# Patient Record
Sex: MALE | Race: WHITE | NOT HISPANIC OR LATINO | Employment: FULL TIME | URBAN - METROPOLITAN AREA
[De-identification: names, ages, dates, MRNs, and addresses within clinical notes are randomized per-mention and may not be internally consistent; named-entity substitution may affect disease eponyms.]

---

## 2018-08-22 ENCOUNTER — OFFICE VISIT - HEALTHEAST (OUTPATIENT)
Dept: CARDIOLOGY | Facility: CLINIC | Age: 43
End: 2018-08-22

## 2018-08-22 DIAGNOSIS — R07.89 CHEST PRESSURE: ICD-10-CM

## 2018-08-22 LAB
ATRIAL RATE - MUSE: 70 BPM
CHOLEST SERPL-MCNC: 222 MG/DL
DIASTOLIC BLOOD PRESSURE - MUSE: NORMAL MMHG
ERYTHROCYTE [SEDIMENTATION RATE] IN BLOOD BY WESTERGREN METHOD: 2 MM/HR (ref 0–15)
FASTING STATUS PATIENT QL REPORTED: YES
HDLC SERPL-MCNC: 41 MG/DL
INTERPRETATION ECG - MUSE: NORMAL
LDLC SERPL CALC-MCNC: 129 MG/DL
P AXIS - MUSE: 1 DEGREES
PR INTERVAL - MUSE: 172 MS
QRS DURATION - MUSE: 96 MS
QT - MUSE: 396 MS
QTC - MUSE: 427 MS
R AXIS - MUSE: 48 DEGREES
SYSTOLIC BLOOD PRESSURE - MUSE: NORMAL MMHG
T AXIS - MUSE: 32 DEGREES
TRIGL SERPL-MCNC: 260 MG/DL
VENTRICULAR RATE- MUSE: 70 BPM

## 2018-08-22 ASSESSMENT — MIFFLIN-ST. JEOR: SCORE: 1880.56

## 2018-09-05 ENCOUNTER — HOSPITAL ENCOUNTER (OUTPATIENT)
Dept: CARDIOLOGY | Facility: CLINIC | Age: 43
Discharge: HOME | End: 2018-09-05
Attending: INTERNAL MEDICINE

## 2018-09-05 DIAGNOSIS — R07.89 CHEST PRESSURE: ICD-10-CM

## 2018-09-05 LAB
CV STRESS CURRENT BP HE: NORMAL
CV STRESS CURRENT HR HE: 101
CV STRESS CURRENT HR HE: 104
CV STRESS CURRENT HR HE: 104
CV STRESS CURRENT HR HE: 105
CV STRESS CURRENT HR HE: 106
CV STRESS CURRENT HR HE: 108
CV STRESS CURRENT HR HE: 113
CV STRESS CURRENT HR HE: 125
CV STRESS CURRENT HR HE: 126
CV STRESS CURRENT HR HE: 129
CV STRESS CURRENT HR HE: 134
CV STRESS CURRENT HR HE: 134
CV STRESS CURRENT HR HE: 135
CV STRESS CURRENT HR HE: 135
CV STRESS CURRENT HR HE: 136
CV STRESS CURRENT HR HE: 139
CV STRESS CURRENT HR HE: 143
CV STRESS CURRENT HR HE: 149
CV STRESS CURRENT HR HE: 152
CV STRESS CURRENT HR HE: 152
CV STRESS CURRENT HR HE: 157
CV STRESS CURRENT HR HE: 160
CV STRESS CURRENT HR HE: 165
CV STRESS CURRENT HR HE: 165
CV STRESS CURRENT HR HE: 83
CV STRESS CURRENT HR HE: 85
CV STRESS CURRENT HR HE: 95
CV STRESS CURRENT HR HE: 96
CV STRESS DEVIATION TIME HE: NORMAL
CV STRESS ECHO PERCENT HR HE: NORMAL
CV STRESS EXERCISE STAGE HE: NORMAL
CV STRESS FINAL RESTING BP HE: NORMAL
CV STRESS FINAL RESTING HR HE: 96
CV STRESS MAX HR HE: 167
CV STRESS MAX TREADMILL GRADE HE: 14
CV STRESS MAX TREADMILL SPEED HE: 3.4
CV STRESS PEAK DIA BP HE: NORMAL
CV STRESS PEAK SYS BP HE: NORMAL
CV STRESS PHASE HE: NORMAL
CV STRESS PROTOCOL HE: NORMAL
CV STRESS RESTING PT POSITION HE: NORMAL
CV STRESS RESTING PT POSITION HE: NORMAL
CV STRESS ST DEVIATION AMOUNT HE: NORMAL
CV STRESS ST DEVIATION ELEVATION HE: NORMAL
CV STRESS ST EVELATION AMOUNT HE: NORMAL
CV STRESS TEST TYPE HE: NORMAL
CV STRESS TOTAL STAGE TIME MIN 1 HE: NORMAL
ECHO EJECTION FRACTION ESTIMATED: 55 %
STRESS ECHO BASELINE BP: NORMAL
STRESS ECHO BASELINE HR: 85
STRESS ECHO CALCULATED PERCENT HR: 93 %
STRESS ECHO LAST STRESS BP: NORMAL
STRESS ECHO LAST STRESS HR: 165
STRESS ECHO POST ESTIMATED WORKLOAD: 10.3
STRESS ECHO POST EXERCISE DUR MIN: 9
STRESS ECHO POST EXERCISE DUR SEC: 0
STRESS ECHO TARGET HR: 152

## 2021-03-04 ENCOUNTER — RECORDS - HEALTHEAST (OUTPATIENT)
Dept: LAB | Facility: CLINIC | Age: 46
End: 2021-03-04

## 2021-03-04 LAB
CHOLEST SERPL-MCNC: 224 MG/DL
FASTING STATUS PATIENT QL REPORTED: ABNORMAL
HDLC SERPL-MCNC: 33 MG/DL
LDLC SERPL CALC-MCNC: 122 MG/DL
LDLC SERPL CALC-MCNC: ABNORMAL MG/DL
TRIGL SERPL-MCNC: 577 MG/DL

## 2021-06-01 VITALS — WEIGHT: 217 LBS | HEIGHT: 70 IN | BODY MASS INDEX: 31.07 KG/M2

## 2021-06-20 NOTE — PROGRESS NOTES
Consultation - [facility]  Robin Dawson,  1975, MRN [id number]    PCP: No Primary Care Provider, None    Assessment and Plan: Chest pain syndrome.  Symptoms are very atypical for coronary disease.  The prolonged duration and constant nature is extremely atypical for an anginal or ischemic syndrome.  No evidence for infarction.  However I cannot exclude that it could be different components to his pain and may be precipitated by ischemia and then a prolonged discomfort noncardiac gastrointestinal or musculoskeletal origin.  In this regard my suggestion would be is to arrange for stress exercise echo study to exclude ischemic heart disease from differential also check an ESR today to exclude the possibility that this may have represented acute pericarditis but without ECG transformation.  For now avoid heavy strenuous activity.  If the symptom would recur he should go back to the ER.  Currently is asymptomatic and I think as long as he is level of activity is low without symptoms can observe as an outpatient until stress study.  Recommendations: Above    Chief Complaint: Atypical chest pain unlikely cardiac origin    HPI:  We have been requested by pt to evaluate Robin Dawson for consultation who is a  43 y.o. year old male for chest pain.   Hx: 43 visit Monday.  shortness of breath chest pressure dizzy sweats.  Sx began Fri night persist into Sat improved Sun then incr Mon before work.  Did not resolve at any pt over w/end. No previous sx, chronic cough wheeze, asthma by hx as child.  Went to ER and released.  Felt much better after release.  Fatigue. Tues fatigue.  CP shortness of breath returned w walk but not rest.  Sx much improved.  Today cough.  Current not sx.  Fri shiver and cold feeling no actual fever.    On no meds, no pmh x above. No allergy  Given NTG 2 in er w varinder but no change in sx.        Medical History  There are no active non-hospital problems to display for this  patient.    No past medical history on file.    Surgical History  He  has no past surgical history on file.    Social History  Reviewed, and he  reports that he has been smoking Cigarettes.  He does not have any smokeless tobacco history on file. He reports that he drinks alcohol. He reports that he does not use illicit drugs.  Smoking status reviewed.  Social history othrwise not contributory to HPI.  Allergies  No Known Allergies    Family History  Reviewed, and family history is not on file.  Extended Emergency Contact Information  Primary Emergency Contact: Katie Erazo   [facility]  Home Phone: [phone]  Relation: Significant Other  Family history otherwise negative or not conributory to HPI.    Psychosocial Needs  Social History     Social History Narrative     Additional psychosocial needs reviewed per nursing assessment.    Prior to Admission Medications    (Not in a hospital admission)    Review of Systems:  Pertinent items are noted in HPI.  Review of systems is negative except for HPI  Physical Exam:  Less than 10 mL of urine  Vitals:    08/22/18 0817   BP: 130/90   Pulse: 68   Resp: 18     Head and neck without focal cranial neurologic defects.  JVD not distended.  Carotid upstroke normal without bruit.  External eye exam normal without icterus.  External ear exam normal.  Neck without cervical lymphadenopathy or thyromegaly.  Lungs: clear to auscultation bilaterally  Heart: regular rate and rhythm, S1, S2 normal, no murmur, click, rub or gallop   Abdomen with normal bowel tones.  Skin without rash, ecchymosis, lesions.  Neuromuscular tone normal.  Peripheral pulse intact and equal.  Joints without swelling or erythema.      [unfilled]    Pertinent Labs  Lab Results: personally reviewed.   Lab Results   Component Value Date     08/20/2018    K 3.9 08/20/2018     (H) 08/20/2018    CO2 22 08/20/2018    BUN 8 08/20/2018    CREATININE 0.89 08/20/2018    CALCIUM 9.1  08/20/2018     Lab Results   Component Value Date    TROPONINI <0.01 08/20/2018     Lab Results   Component Value Date    WBC 7.5 08/20/2018    HGB 16.5 08/20/2018    HCT 45.8 08/20/2018    MCV 91 08/20/2018     08/20/2018     No results found for: CHOL, TRIG, HDL, LDLDIRECT    Pertinent Radiology  Radiology Results: See Report  EKG Results: See Report       Current Outpatient Prescriptions:      cholecalciferol, vitamin D3, 1,000 unit tablet, Take 1,000 Units by mouth daily., Disp: , Rfl:      CYANOCOBALAMIN, VITAMIN B-12, ORAL, Take 1 tablet by mouth daily., Disp: , Rfl:      UNABLE TO FIND, 1 tablet. Med Name: Alive Multivitamin chewable, Disp: , Rfl:

## 2023-02-06 NOTE — H&P
Colorectal Surgery Staff:  I have seen and examined the patient. I agree with the above documentation and plan of the fellow/PA above with the following additions/chages:    47M w/ anal fistula    Plan for rectal exam unde anesthesia, possible fistulotomy, possible seton    MD ADRIA KnappA  [facility]  Office: [phone]  2/16/2023 7:42 AM

## 2023-02-10 RX ORDER — MULTIPLE VITAMINS W/ MINERALS TAB 9MG-400MCG
1 TAB ORAL DAILY
COMMUNITY

## 2023-02-10 RX ORDER — CHLORAL HYDRATE 500 MG
2 CAPSULE ORAL DAILY
COMMUNITY

## 2023-02-15 ENCOUNTER — ANESTHESIA EVENT (OUTPATIENT)
Dept: SURGERY | Facility: AMBULATORY SURGERY CENTER | Age: 48
End: 2023-02-15

## 2023-02-16 ENCOUNTER — HOSPITAL ENCOUNTER (OUTPATIENT)
Facility: AMBULATORY SURGERY CENTER | Age: 48
Discharge: HOME | End: 2023-02-16
Attending: COLON & RECTAL SURGERY

## 2023-02-16 ENCOUNTER — ANESTHESIA (OUTPATIENT)
Dept: SURGERY | Facility: AMBULATORY SURGERY CENTER | Age: 48
End: 2023-02-16

## 2023-02-16 VITALS
DIASTOLIC BLOOD PRESSURE: 53 MMHG | BODY MASS INDEX: 24.5 KG/M2 | OXYGEN SATURATION: 99 % | TEMPERATURE: 97.4 F | RESPIRATION RATE: 16 BRPM | HEIGHT: 71 IN | SYSTOLIC BLOOD PRESSURE: 93 MMHG | HEART RATE: 55 BPM | WEIGHT: 175 LBS

## 2023-02-16 DIAGNOSIS — K60.30 ANAL FISTULA: Primary | ICD-10-CM

## 2023-02-16 RX ORDER — ACETAMINOPHEN 325 MG/1
650 TABLET ORAL
Status: DISCONTINUED | OUTPATIENT
Start: 2023-02-16 | End: 2023-02-17 | Stop reason: HOSPADM

## 2023-02-16 RX ORDER — OXYCODONE HYDROCHLORIDE 5 MG/1
5 TABLET ORAL EVERY 4 HOURS PRN
Status: DISCONTINUED | OUTPATIENT
Start: 2023-02-16 | End: 2023-02-17 | Stop reason: HOSPADM

## 2023-02-16 RX ORDER — ONDANSETRON 2 MG/ML
INJECTION INTRAMUSCULAR; INTRAVENOUS PRN
Status: DISCONTINUED | OUTPATIENT
Start: 2023-02-16 | End: 2023-02-16

## 2023-02-16 RX ORDER — ONDANSETRON 4 MG/1
4 TABLET, ORALLY DISINTEGRATING ORAL
Status: DISCONTINUED | OUTPATIENT
Start: 2023-02-16 | End: 2023-02-17 | Stop reason: HOSPADM

## 2023-02-16 RX ORDER — FENTANYL CITRATE 0.05 MG/ML
50 INJECTION, SOLUTION INTRAMUSCULAR; INTRAVENOUS EVERY 5 MIN PRN
Status: DISCONTINUED | OUTPATIENT
Start: 2023-02-16 | End: 2023-02-17 | Stop reason: HOSPADM

## 2023-02-16 RX ORDER — FENTANYL CITRATE 50 UG/ML
INJECTION, SOLUTION INTRAMUSCULAR; INTRAVENOUS PRN
Status: DISCONTINUED | OUTPATIENT
Start: 2023-02-16 | End: 2023-02-16

## 2023-02-16 RX ORDER — HYDROMORPHONE HCL IN WATER/PF 6 MG/30 ML
0.2 PATIENT CONTROLLED ANALGESIA SYRINGE INTRAVENOUS EVERY 5 MIN PRN
Status: DISCONTINUED | OUTPATIENT
Start: 2023-02-16 | End: 2023-02-17 | Stop reason: HOSPADM

## 2023-02-16 RX ORDER — OXYCODONE HYDROCHLORIDE 10 MG/1
10 TABLET ORAL EVERY 4 HOURS PRN
Status: DISCONTINUED | OUTPATIENT
Start: 2023-02-16 | End: 2023-02-17 | Stop reason: HOSPADM

## 2023-02-16 RX ORDER — ONDANSETRON 2 MG/ML
4 INJECTION INTRAMUSCULAR; INTRAVENOUS EVERY 30 MIN PRN
Status: DISCONTINUED | OUTPATIENT
Start: 2023-02-16 | End: 2023-02-17 | Stop reason: HOSPADM

## 2023-02-16 RX ORDER — ACETAMINOPHEN 160 MG
TABLET,DISINTEGRATING ORAL PRN
Status: DISCONTINUED | OUTPATIENT
Start: 2023-02-16 | End: 2023-02-16 | Stop reason: HOSPADM

## 2023-02-16 RX ORDER — DEXAMETHASONE SODIUM PHOSPHATE 4 MG/ML
INJECTION, SOLUTION INTRA-ARTICULAR; INTRALESIONAL; INTRAMUSCULAR; INTRAVENOUS; SOFT TISSUE PRN
Status: DISCONTINUED | OUTPATIENT
Start: 2023-02-16 | End: 2023-02-16

## 2023-02-16 RX ORDER — ACETAMINOPHEN 325 MG/1
975 TABLET ORAL ONCE
Status: COMPLETED | OUTPATIENT
Start: 2023-02-16 | End: 2023-02-16

## 2023-02-16 RX ORDER — ACETAMINOPHEN 325 MG/1
650 TABLET ORAL EVERY 4 HOURS PRN
Qty: 100 TABLET | Refills: 0 | COMMUNITY
Start: 2023-02-16 | End: 2023-03-02

## 2023-02-16 RX ORDER — PROPOFOL 10 MG/ML
INJECTION, EMULSION INTRAVENOUS PRN
Status: DISCONTINUED | OUTPATIENT
Start: 2023-02-16 | End: 2023-02-16

## 2023-02-16 RX ORDER — LIDOCAINE HYDROCHLORIDE 10 MG/ML
INJECTION, SOLUTION INFILTRATION; PERINEURAL PRN
Status: DISCONTINUED | OUTPATIENT
Start: 2023-02-16 | End: 2023-02-16

## 2023-02-16 RX ORDER — SODIUM CHLORIDE, SODIUM LACTATE, POTASSIUM CHLORIDE, CALCIUM CHLORIDE 600; 310; 30; 20 MG/100ML; MG/100ML; MG/100ML; MG/100ML
INJECTION, SOLUTION INTRAVENOUS CONTINUOUS
Status: DISCONTINUED | OUTPATIENT
Start: 2023-02-16 | End: 2023-02-17 | Stop reason: HOSPADM

## 2023-02-16 RX ORDER — FENTANYL CITRATE 0.05 MG/ML
25 INJECTION, SOLUTION INTRAMUSCULAR; INTRAVENOUS EVERY 5 MIN PRN
Status: DISCONTINUED | OUTPATIENT
Start: 2023-02-16 | End: 2023-02-17 | Stop reason: HOSPADM

## 2023-02-16 RX ORDER — ONDANSETRON 4 MG/1
4 TABLET, ORALLY DISINTEGRATING ORAL EVERY 30 MIN PRN
Status: DISCONTINUED | OUTPATIENT
Start: 2023-02-16 | End: 2023-02-17 | Stop reason: HOSPADM

## 2023-02-16 RX ORDER — HYDROMORPHONE HCL IN WATER/PF 6 MG/30 ML
0.4 PATIENT CONTROLLED ANALGESIA SYRINGE INTRAVENOUS EVERY 5 MIN PRN
Status: DISCONTINUED | OUTPATIENT
Start: 2023-02-16 | End: 2023-02-17 | Stop reason: HOSPADM

## 2023-02-16 RX ORDER — OXYCODONE HYDROCHLORIDE 5 MG/1
5-10 TABLET ORAL EVERY 6 HOURS PRN
Qty: 8 TABLET | Refills: 0 | Status: SHIPPED | OUTPATIENT
Start: 2023-02-16 | End: 2023-02-19

## 2023-02-16 RX ORDER — PROPOFOL 10 MG/ML
INJECTION, EMULSION INTRAVENOUS CONTINUOUS PRN
Status: DISCONTINUED | OUTPATIENT
Start: 2023-02-16 | End: 2023-02-16

## 2023-02-16 RX ORDER — NEOSTIGMINE METHYLSULFATE 1 MG/ML
VIAL (ML) INJECTION PRN
Status: DISCONTINUED | OUTPATIENT
Start: 2023-02-16 | End: 2023-02-16

## 2023-02-16 RX ORDER — LIDOCAINE 40 MG/G
CREAM TOPICAL
Status: DISCONTINUED | OUTPATIENT
Start: 2023-02-16 | End: 2023-02-17 | Stop reason: HOSPADM

## 2023-02-16 RX ORDER — GLYCOPYRROLATE 0.2 MG/ML
INJECTION, SOLUTION INTRAMUSCULAR; INTRAVENOUS PRN
Status: DISCONTINUED | OUTPATIENT
Start: 2023-02-16 | End: 2023-02-16

## 2023-02-16 RX ADMIN — Medication 4 MG: at 08:42

## 2023-02-16 RX ADMIN — PROPOFOL 200 MG: 10 INJECTION, EMULSION INTRAVENOUS at 07:54

## 2023-02-16 RX ADMIN — DEXAMETHASONE SODIUM PHOSPHATE 4 MG: 4 INJECTION, SOLUTION INTRA-ARTICULAR; INTRALESIONAL; INTRAMUSCULAR; INTRAVENOUS; SOFT TISSUE at 07:54

## 2023-02-16 RX ADMIN — PROPOFOL 200 MCG/KG/MIN: 10 INJECTION, EMULSION INTRAVENOUS at 07:58

## 2023-02-16 RX ADMIN — GLYCOPYRROLATE 0.8 MG: 0.2 INJECTION, SOLUTION INTRAMUSCULAR; INTRAVENOUS at 08:42

## 2023-02-16 RX ADMIN — LIDOCAINE HYDROCHLORIDE 30 MG: 10 INJECTION, SOLUTION INFILTRATION; PERINEURAL at 07:54

## 2023-02-16 RX ADMIN — SODIUM CHLORIDE, SODIUM LACTATE, POTASSIUM CHLORIDE, CALCIUM CHLORIDE: 600; 310; 30; 20 INJECTION, SOLUTION INTRAVENOUS at 07:29

## 2023-02-16 RX ADMIN — FENTANYL CITRATE 50 MCG: 50 INJECTION, SOLUTION INTRAMUSCULAR; INTRAVENOUS at 07:54

## 2023-02-16 RX ADMIN — ACETAMINOPHEN 975 MG: 325 TABLET ORAL at 07:29

## 2023-02-16 RX ADMIN — Medication 50 MG: at 07:54

## 2023-02-16 RX ADMIN — ONDANSETRON 4 MG: 2 INJECTION INTRAMUSCULAR; INTRAVENOUS at 07:54

## 2023-02-16 RX ADMIN — OXYCODONE HYDROCHLORIDE 5 MG: 5 TABLET ORAL at 10:15

## 2023-02-16 RX ADMIN — FENTANYL CITRATE 50 MCG: 50 INJECTION, SOLUTION INTRAMUSCULAR; INTRAVENOUS at 08:14

## 2023-02-16 ASSESSMENT — COPD QUESTIONNAIRES: COPD: 0

## 2023-02-16 ASSESSMENT — LIFESTYLE VARIABLES: TOBACCO_USE: 1

## 2023-02-16 ASSESSMENT — ENCOUNTER SYMPTOMS
SEIZURES: 0
DYSRHYTHMIAS: 0

## 2023-02-16 NOTE — ANESTHESIA POSTPROCEDURE EVALUATION
Patient: Robin Dawson    Procedure: Procedure(s):  EXAM UNDER ANESTHESIA, DEBRIDEMENT OF ABSCESS CAVITY, SETON PLACEMENT       Anesthesia Type:  General    Note:  Disposition: Outpatient   Postop Pain Control: Uneventful            Sign Out: Well controlled pain   PONV: No   Neuro/Psych: Uneventful            Sign Out: Acceptable/Baseline neuro status   Airway/Respiratory: Uneventful            Sign Out: Acceptable/Baseline resp. status   CV/Hemodynamics: Uneventful            Sign Out: Acceptable CV status; No obvious hypovolemia; No obvious fluid overload   Other NRE: NONE   DID A NON-ROUTINE EVENT OCCUR? No           Last vitals:  Vitals Value Taken Time   /61 02/16/23 0925   Temp 97.5  F (36.4  C) 02/16/23 0906   Pulse 86 02/16/23 0924   Resp 16 02/16/23 0925   SpO2 96 % 02/16/23 0925   Vitals shown include unvalidated device data.    Electronically Signed By: Concha Melgar MD  February 16, 2023  10:25 AM

## 2023-02-16 NOTE — DISCHARGE INSTRUCTIONS
Patient Discharge Instructions:    You have just undergone anorectal surgery.  Here are a few things to expect after your surgery:    1) It is common to have pain after surgery.  We try to focus on non-narcotic medications when possible, although sometimes we do give a narcotic prescription for pain that cannot be controlled with Tylenol and Ibuprofen. Take the medications as follows:  Tylenol: 650mg every 4 hours for 48 hours. Then every 4 hour as needed for pain. Do not take the Tylenol if you have been otherwise directed by a doctor not to take it (for liver problems, etc).  Ibuprofen: 800mg every 8 hours for 48 hours WITH FOOD. Then every 8 hours as needed for pain. Don't take NSAIDs if you have Crohn's, Ulcerative Colitis, kidney problems, or have been otherwise instructed not to take them.   You should offset these medications (e.g. Tylenol at 12:00PM, Ibuprofen at 2pm, Tylenol at 4:00PM, Tylenol at 8:00PM, Ibuprofen at 10:00PM).  If you have Oxycodone, you can take it at any time as long as you space it out every 4 hours.     2) You may have some spotting or mild amounts of bleeding/bloody drainage.  If you see more significant bleeding, try holding some pressure over the wound for 15-20 minutes.   If you pass more than a cup full of blood or you cannot get the bleeding to stop by holding pressure, call the office.    3) Infections in this area are rare, but can be serious.  If you see small amounts of yellowish/pus like drainage in the days following surgery, this is expected. However, if you have increasing pain, increasing drainage, fevers, or an inability to urinate (can't pee), call the office or go the ER.     4) You will feel numb in the anorectal area for 4-6 hours after surgery due to the numbing medication used in the operating room.  It is possible you may experience some fecal incontinence (accidental passage of gas/stool) during this time.  The numbness will resolve on its own.  Once you feel  sensation returning, you should consider taking something for pain as you can expect oral medications to take 30-60 minutes before you start feeling their effects.    5) There is generally no specific wound care necessary immediately after surgery (unless otherwise instructed by your surgeon).  Simply showering/bathing 1-2 times a day and after bowel movements is sufficient to keep the wounds clean.  You may want to keep a dry gauze/pad over the wound site as it is normal to have some amount of drainage, and this drainage can be irritating to the skin.  If you have a packing in the wound, your surgeon will give you more specific instructions on how to manage this.    6) For some operations you may have stitches in your wound.  Those stitches almost always break within a few days of surgery.  If you feel a pop or the wound opens - this is OK.  The wound will continue to fill in on its own.  It is still ok to shower/bathe as normal.  Expect some amount of drainage if the wound is open.    7) Avoiding constipation after surgery is very important. Start by taking Psyllium Husk (e.g. Metamucil or Konsyl) 1-2 tablespoons in a large glass of water daily. In addition to this, it is very important to drink at least 64 ounces of water daily. If you get constipated, it will be much more uncomfortable when you do have a bowel movement. If you do not have a BM in 2 days, try one of the following medications (over the counter) listed below:   Milk of Magnesia 30 mL every 8 hours until BM  Miralax 1-2 capfuls daily until BM (this may take 1-2 days)  Senna 1-2 tabs twice a day as necessary    8) Your only activity restrictions are no driving while you are taking narcotics.  You may want to avoid heavy lifting/strenuous exercise for the first 1-2 days after surgery, but if you feel up to resuming normal activities/exercise, this is ok.    9) Lastly, if you have any questions or concerns - PLEASE CALL ([phone])!  Our office has  someone on call 24 hours a day.  If your question is one that can wait until normal business hours (Mon-Fri 8:30AM-5PM), it is better to wait until the daytime as someone more familiar with your care will be able to help you.  However, if it is an emergency, the on-call surgeon will be able to give you good advice.    Leana Yo MD, RUBA  [facility]  Office: [phone]  2/16/2023 7:57 AM    Take tylenol and ibuprofen every 6 hours as your main form of pain control.     Acetaminophen (Tylenol): Next Dose: 1:29 pm. Take 650-1000 mg every 6 hours for mild to moderate pain.    Ibuprofen (Motrin, Advil): Next Dose: When you get home. Take 600 mg every 6 hours for mild to moderate pain.    Do not exceed 4,000 mg of acetaminophen during a 24 hour period  or 1000 mg per dose. Keep in mind that acetaminophen can also be found in many over-the-counter cold medications as well as narcotics that may be given for pain.     Narcotics:    Oxycodone: Next Dose: 4:15 pm Take 1-2 tab(s) every 6 hours for moderate to severe pain not controlled with tylenol and ibuprofen.    Senna-Docusate (Stool Softener): Next dose: When you get home. Take as instructed on the bottle. This is an over-the-counter medication. Take this while taking narcotic medications to prevent constipation.    If you have any questions or concerns regarding your procedure, please contact Dr. Yo, his office number is [phone].     Adult Discharge Orders & Instructions     For 24 hours after surgery    Get plenty of rest.  A responsible adult must stay with you for at least 24 hours after you leave the hospital.   Do not drive or use heavy equipment.  If you have weakness or tingling, don't drive or use heavy equipment until this feeling goes away.  Do not drink alcohol.  Avoid strenuous or risky activities.  Ask for help when climbing stairs.   You may feel lightheaded.  IF so, sit for a few minutes before standing.  Have someone  help you get up.   If you have nausea (feel sick to your stomach): Drink only clear liquids such as apple juice, ginger ale, broth or 7-Up.  Rest may also help.  Be sure to drink enough fluids.  Move to a regular diet as you feel able.  You may have a slight fever. Call the doctor if your fever is over 100 F (37.7 C) (taken under the tongue) or lasts longer than 24 hours.  You may have a dry mouth, a sore throat, muscle aches or trouble sleeping.  These should go away after 24 hours.  Do not make important or legal decisions.

## 2023-02-16 NOTE — ANESTHESIA PREPROCEDURE EVALUATION
Anesthesia Pre-Procedure Evaluation    Patient: Robin Dawson   MRN: [id number] : 1975        Procedure : Procedure(s):  EXAM UNDER ANESTHESIA, WITH POSSIBLE FISTULOTOMY, POSSIBLE SETON PLACEMENT          History reviewed. No pertinent past medical history.   History reviewed. No pertinent surgical history.   Allergies   Allergen Reactions     Naproxen Shortness Of Breath      Social History     Tobacco Use     Smoking status: Every Day     Packs/day: 1.00     Types: Cigarettes     Smokeless tobacco: Not on file   Substance Use Topics     Alcohol use: Yes      Wt Readings from Last 1 Encounters:   23 79.4 kg (175 lb)        Anesthesia Evaluation   Pt has had prior anesthetic.     No history of anesthetic complications       ROS/MED HX  ENT/Pulmonary:     (+) tobacco use, Current use,  (-) asthma, COPD, sleep apnea, FRANCES risk factors and recent URI   Neurologic:    (-) no seizures and no CVA   Cardiovascular:    (-) hypertension, taking anticoagulants/antiplatelets, syncope and arrhythmias   METS/Exercise Tolerance: >4 METS    Hematologic:    (-) anemia   Musculoskeletal:       GI/Hepatic: Comment: Perirectal abscess   (-) GERD   Renal/Genitourinary:    (-) renal disease   Endo:    (-) Type I DM, Type II DM, thyroid disease and obesity   Psychiatric/Substance Use:     (+) : Denies any illicit drug use  (-) chronic opioid use history   Infectious Disease:    (-) Recent Fever   Malignancy:       Other:            Physical Exam    Airway        Mallampati: II   TM distance: > 3 FB   Neck ROM: full   Mouth opening: > 3 cm    Respiratory Devices and Support         Dental     Comment: Fair, no loose or removable teeth        Cardiovascular          Rhythm and rate: regular and normal     Pulmonary           breath sounds clear to auscultation       Other findings:   CBC, BMP, ECG, chest x-ray reviewed - unremarkable     OUTSIDE LABS:  CBC:   Lab Results   Component Value Date    WBC 7.5 2018     WBC 14.1 (H) 06/18/2013    HGB 16.5 08/20/2018    HGB 16.5 06/18/2013    HCT 45.8 08/20/2018    HCT 47.0 06/18/2013     08/20/2018     06/18/2013     BMP:   Lab Results   Component Value Date     08/20/2018    POTASSIUM 3.9 08/20/2018    CHLORIDE 108 (H) 08/20/2018    CO2 22 08/20/2018    BUN 8 08/20/2018    CR 0.89 08/20/2018     08/20/2018     COAGS: No results found for: PTT, INR, FIBR  POC: No results found for: BGM, HCG, HCGS  HEPATIC: No results found for: ALBUMIN, PROTTOTAL, ALT, AST, GGT, ALKPHOS, BILITOTAL, BILIDIRECT, [place]  OTHER:   Lab Results   Component Value Date    CHENCHO 9.1 08/20/2018    MAG 1.9 08/20/2018    SED 2 08/22/2018       Anesthesia Plan    ASA Status:  2   NPO Status:  NPO Appropriate    Anesthesia Type: General.     - Airway: ETT   Induction: Intravenous, Propofol.   Maintenance: TIVA.        Consents    Anesthesia Plan(s) and associated risks, benefits, and realistic alternatives discussed. Questions answered and patient/representative(s) expressed understanding.    - Discussed:     - Discussed with:  Patient, Spouse      - Extended Intubation/Ventilatory Support Discussed: No.      - Patient is DNR/DNI Status: No    Use of blood products discussed: No .     Postoperative Care    Pain management: IV analgesics, Multi-modal analgesia, Oral pain medications.   PONV prophylaxis: Ondansetron (or other 5HT-3), Dexamethasone or Solumedrol     Comments:    Other Comments:   GETA, TIVA  Acetaminophen (pre-op)  Dexamethasone, ondansetron PONV ppx             Concha Melgar MD

## 2023-02-16 NOTE — ANESTHESIA CARE TRANSFER NOTE
Patient: Robin Dawson    Procedure: Procedure(s):  EXAM UNDER ANESTHESIA, DEBRIDEMENT OF ABSCESS CAVITY, SETON PLACEMENT       Diagnosis: Anal fissure [K60.2]  Diagnosis Additional Information: No value filed.    Anesthesia Type:   General     Note:    Oropharynx: oropharynx clear of all foreign objects  Level of Consciousness: drowsy  Oxygen Supplementation: face mask  Level of Supplemental Oxygen (L/min / FiO2): 8  Independent Airway: airway patency satisfactory and stable  Dentition: dentition unchanged  Vital Signs Stable: post-procedure vital signs reviewed and stable  Report to RN Given: handoff report given  Patient transferred to: PACU    Handoff Report: Identifed the Patient, Identified the Reponsible Provider, Reviewed the pertinent medical history, Discussed the surgical course, Reviewed Intra-OP anesthesia mangement and issues during anesthesia, Set expectations for post-procedure period and Allowed opportunity for questions and acknowledgement of understanding      Vitals:  Vitals Value Taken Time   /63 02/16/23 0906   Temp 36.4  C (97.5  F) 02/16/23 0906   Pulse 89 02/16/23 0904   Resp 16 02/16/23 0906   SpO2 99 % 02/16/23 0906       Electronically Signed By: Alida Dye CRNA, MAUREEN MÉNDEZ  February 16, 2023  9:06 AM

## 2023-02-16 NOTE — OP NOTE
[facility] OPERATIVE NOTE    DATE OF SERVICE: 2/16/2023     PROCEDURE NAME:   1. Rectal exam under anesthesia  2. Debridement of chronic abscess cavity (sharp debridement with curette -skin and subcutaneous fat)  3. Seton placement     PRE-PROCEDURE DIAGNOSIS: Anal fistula     POST-PROCEDURE DIAGNOSIS: Perianal abscess     SURGEON: Leana Yo MD     ASSISTANT: Yasmeen Hayward MD [facility] Colorectal Surgery Fellow     FINDINGS: This was not a typical anal fistula.  There was an external opening in the left anterolateral position.  However, this probed more towards the posterior midline.  Injection with hydrogen peroxide demonstrated for external openings continuing towards a pit in the posterior midline.  There was no connection seen to the anal canal.  The differential diagnosis for this problem includes anorectal abscess, pilonidal disease, or hidradenitis.  He will come back for a planned staged procedure with likely preoperative MRI.     ESTIMATED BLOOD LOSS: 5 mL     ANESTHESIA: General     SPECIMEN: None    INDICATIONS FOR PROCEDURE:  Robin Dawson is a 47 year old male presenting with a chronic draining abscess site in the perianal fistula. Based on examination, this was suspected to be an anal fistula. The risks and benefits of surgery were discussed with the patient including infection, abscess recurrence, need for further operative interventions, possible damage to the sphincter and incontinence (or changes in continence) and increased pain and bleeding were discussed with the patient. Alternatives were also discussed. The patient agreed to proceed with surgery and informed consent was obtained.     DESCRIPTION OF PROCEDURE:  The patient was taken to the operating room and placed under general anesthesia. They were then placed in the prone sophie knife position on the operating room table. The buttocks were taped apart. The surgical area was then prepped and draped in the  usual sterile fashion. A timeout was performed per protocol.  Examination of the perianal skin was significant for an external opening in the left anterolateral position.  Next, a terminal pudendal nerve block was performed with 40mL of a 50/50 mix of 1% lidocaine with epinephrine and 0.25% bupivicaine. A digital rectal exam was performed which revealed no abnormalities.  Next a lubricated Webber bivalve anoscope was placed into the anal canal.  Examination of the anal canal was significant for no abnormalities.    Based on these findings I proceeded to evaluate the fistula tract.  A fistula probe was placed within the external fistula tract.  Given the location of the external opening, I initially suspected that this would track towards the anterior midline or radially across from it.  However, the abscess cavity clearly went directly posterior.  To better delineate this and to see if he had a posterior midline internal opening, the tract was injected with hydrogen peroxide.  This ended up coming out of 4 small external openings with the furthest one away being a pit in the midline at the top of the gluteal cleft.  There was no connection to the anal canal.  Upon further inspection, these external openings were chronic thickened areas in the skin but did not have the typical appearance of an anorectal abscess external opening.  This raised the possibility of hidradenitis or even pilonidal disease.  I was able to connect a fistula probe from the most anterior external opening to the most posterior external opening.  However, if I would have open this it would have divided a significant portion of external sphincter muscle.  Therefore, approximately 2 cm was opened up in each direction exposing the abscess cavity underneath.  This was sharply debrided using a curette.  The granulation tissue was removed.  The cavity was now widely drained and sharply debrided.  To make sure it stayed open, the edges were marsupialized  on the more anterior cavity and a blue Silastic vessel loop was placed in as a seton and secured to itself using 3-0 silk ties.    Hemostasis was confirmed. All sponge, needle and instrument counts were correct at the end of the procedure. The patient tolerated the procedure well and was awakened from anesthesia without difficulty, and transferred to the recovery room in stable condition.    COMPLICATIONS: None apparent    DISPOSITION: Stable to PACU    Leana Yo MD, RUBA  [facility]  Office: [phone]  2/16/2023 7:57 AM

## 2023-02-16 NOTE — ANESTHESIA PROCEDURE NOTES
Airway       Patient location during procedure: OR       Procedure Start/Stop Times: 2/16/2023 7:57 AM  Staff -        CRNA: Alida Dye APRN CRNA       Performed By: CRNAIndications and Patient Condition       Indications for airway management: fannie-procedural       Induction type:intravenous       Mask difficulty assessment: 2 - vent by mask + OA or adjuvant +/- NMBA    Final Airway Details       Final airway type: endotracheal airway       Successful airway: ETT - single  Endotracheal Airway Details        ETT size (mm): 7.5       Cuffed: yes       Successful intubation technique: direct laryngoscopy       DL Blade Type: Torres 2       Grade View of Cords: 1       Adjucts: stylet       Position: Right       Measured from: lips       Secured at (cm): 23       Bite block used: Soft    Post intubation assessment        Placement verified by: capnometry, equal breath sounds and chest rise        Number of attempts at approach: 1       Number of other approaches attempted: 0       Secured with: silk tape       Ease of procedure: easy       Dentition: Unchanged       Dental guard used and removed. Dental Guard Type: Proguard Red.    Medication(s) Administered   Medication Administration Time: 2/16/2023 7:57 AM

## 2023-04-05 ENCOUNTER — ANESTHESIA EVENT (OUTPATIENT)
Dept: SURGERY | Facility: AMBULATORY SURGERY CENTER | Age: 48
End: 2023-04-05

## 2023-04-06 ENCOUNTER — ANESTHESIA (OUTPATIENT)
Dept: SURGERY | Facility: AMBULATORY SURGERY CENTER | Age: 48
End: 2023-04-06

## 2023-04-06 ENCOUNTER — HOSPITAL ENCOUNTER (OUTPATIENT)
Facility: AMBULATORY SURGERY CENTER | Age: 48
Discharge: HOME | End: 2023-04-06
Attending: COLON & RECTAL SURGERY

## 2023-04-06 VITALS
WEIGHT: 175 LBS | BODY MASS INDEX: 24.5 KG/M2 | DIASTOLIC BLOOD PRESSURE: 65 MMHG | HEIGHT: 71 IN | HEART RATE: 65 BPM | RESPIRATION RATE: 16 BRPM | SYSTOLIC BLOOD PRESSURE: 103 MMHG | OXYGEN SATURATION: 100 % | TEMPERATURE: 96.9 F

## 2023-04-06 DIAGNOSIS — K60.30 ANAL FISTULA: ICD-10-CM

## 2023-04-06 LAB — COLONOSCOPY: NORMAL

## 2023-04-06 RX ORDER — LIDOCAINE HYDROCHLORIDE 10 MG/ML
INJECTION, SOLUTION EPIDURAL; INFILTRATION; INTRACAUDAL; PERINEURAL PRN
Status: DISCONTINUED | OUTPATIENT
Start: 2023-04-06 | End: 2023-04-06 | Stop reason: HOSPADM

## 2023-04-06 RX ORDER — ACETAMINOPHEN 160 MG
TABLET,DISINTEGRATING ORAL PRN
Status: DISCONTINUED | OUTPATIENT
Start: 2023-04-06 | End: 2023-04-06 | Stop reason: HOSPADM

## 2023-04-06 RX ORDER — LIDOCAINE 40 MG/G
CREAM TOPICAL
Status: DISCONTINUED | OUTPATIENT
Start: 2023-04-06 | End: 2023-04-07 | Stop reason: HOSPADM

## 2023-04-06 RX ORDER — PROPOFOL 10 MG/ML
INJECTION, EMULSION INTRAVENOUS PRN
Status: DISCONTINUED | OUTPATIENT
Start: 2023-04-06 | End: 2023-04-06

## 2023-04-06 RX ORDER — ONDANSETRON 4 MG/1
4 TABLET, ORALLY DISINTEGRATING ORAL
Status: DISCONTINUED | OUTPATIENT
Start: 2023-04-06 | End: 2023-04-07 | Stop reason: HOSPADM

## 2023-04-06 RX ORDER — OXYCODONE HYDROCHLORIDE 5 MG/1
5 TABLET ORAL EVERY 4 HOURS PRN
Status: DISCONTINUED | OUTPATIENT
Start: 2023-04-06 | End: 2023-04-07 | Stop reason: HOSPADM

## 2023-04-06 RX ORDER — FENTANYL CITRATE 0.05 MG/ML
25 INJECTION, SOLUTION INTRAMUSCULAR; INTRAVENOUS
Status: DISCONTINUED | OUTPATIENT
Start: 2023-04-06 | End: 2023-04-07 | Stop reason: HOSPADM

## 2023-04-06 RX ORDER — SODIUM CHLORIDE, SODIUM LACTATE, POTASSIUM CHLORIDE, CALCIUM CHLORIDE 600; 310; 30; 20 MG/100ML; MG/100ML; MG/100ML; MG/100ML
INJECTION, SOLUTION INTRAVENOUS CONTINUOUS
Status: DISCONTINUED | OUTPATIENT
Start: 2023-04-06 | End: 2023-04-07 | Stop reason: HOSPADM

## 2023-04-06 RX ORDER — ACETAMINOPHEN 325 MG/1
650 TABLET ORAL EVERY 4 HOURS PRN
Qty: 100 TABLET | Refills: 0 | COMMUNITY
Start: 2023-04-06 | End: 2023-04-20

## 2023-04-06 RX ORDER — PROPOFOL 10 MG/ML
INJECTION, EMULSION INTRAVENOUS CONTINUOUS PRN
Status: DISCONTINUED | OUTPATIENT
Start: 2023-04-06 | End: 2023-04-06

## 2023-04-06 RX ORDER — ACETAMINOPHEN 325 MG/1
975 TABLET ORAL ONCE
Status: COMPLETED | OUTPATIENT
Start: 2023-04-06 | End: 2023-04-06

## 2023-04-06 RX ORDER — LIDOCAINE HYDROCHLORIDE 20 MG/ML
INJECTION, SOLUTION INFILTRATION; PERINEURAL PRN
Status: DISCONTINUED | OUTPATIENT
Start: 2023-04-06 | End: 2023-04-06

## 2023-04-06 RX ORDER — ACETAMINOPHEN 325 MG/1
650 TABLET ORAL
Status: DISCONTINUED | OUTPATIENT
Start: 2023-04-06 | End: 2023-04-07 | Stop reason: HOSPADM

## 2023-04-06 RX ORDER — KETAMINE HYDROCHLORIDE 10 MG/ML
INJECTION INTRAMUSCULAR; INTRAVENOUS PRN
Status: DISCONTINUED | OUTPATIENT
Start: 2023-04-06 | End: 2023-04-06

## 2023-04-06 RX ORDER — ONDANSETRON 2 MG/ML
INJECTION INTRAMUSCULAR; INTRAVENOUS PRN
Status: DISCONTINUED | OUTPATIENT
Start: 2023-04-06 | End: 2023-04-06

## 2023-04-06 RX ORDER — GLYCOPYRROLATE 0.2 MG/ML
INJECTION, SOLUTION INTRAMUSCULAR; INTRAVENOUS PRN
Status: DISCONTINUED | OUTPATIENT
Start: 2023-04-06 | End: 2023-04-06

## 2023-04-06 RX ORDER — OXYCODONE HYDROCHLORIDE 5 MG/1
5 TABLET ORAL EVERY 4 HOURS PRN
Qty: 8 TABLET | Refills: 0 | Status: SHIPPED | OUTPATIENT
Start: 2023-04-06

## 2023-04-06 RX ORDER — DEXAMETHASONE SODIUM PHOSPHATE 4 MG/ML
INJECTION, SOLUTION INTRA-ARTICULAR; INTRALESIONAL; INTRAMUSCULAR; INTRAVENOUS; SOFT TISSUE PRN
Status: DISCONTINUED | OUTPATIENT
Start: 2023-04-06 | End: 2023-04-06

## 2023-04-06 RX ORDER — OXYCODONE HYDROCHLORIDE 10 MG/1
10 TABLET ORAL EVERY 4 HOURS PRN
Status: DISCONTINUED | OUTPATIENT
Start: 2023-04-06 | End: 2023-04-07 | Stop reason: HOSPADM

## 2023-04-06 RX ORDER — BUPIVACAINE HYDROCHLORIDE AND EPINEPHRINE 2.5; 5 MG/ML; UG/ML
INJECTION, SOLUTION INFILTRATION; PERINEURAL PRN
Status: DISCONTINUED | OUTPATIENT
Start: 2023-04-06 | End: 2023-04-06 | Stop reason: HOSPADM

## 2023-04-06 RX ADMIN — GLYCOPYRROLATE 0.2 MG: 0.2 INJECTION, SOLUTION INTRAMUSCULAR; INTRAVENOUS at 10:22

## 2023-04-06 RX ADMIN — Medication 50 MCG: at 10:31

## 2023-04-06 RX ADMIN — DEXAMETHASONE SODIUM PHOSPHATE 4 MG: 4 INJECTION, SOLUTION INTRA-ARTICULAR; INTRALESIONAL; INTRAMUSCULAR; INTRAVENOUS; SOFT TISSUE at 10:34

## 2023-04-06 RX ADMIN — LIDOCAINE HYDROCHLORIDE 2 ML: 20 INJECTION, SOLUTION INFILTRATION; PERINEURAL at 10:26

## 2023-04-06 RX ADMIN — KETAMINE HYDROCHLORIDE 10 MG: 10 INJECTION INTRAMUSCULAR; INTRAVENOUS at 11:01

## 2023-04-06 RX ADMIN — SODIUM CHLORIDE, SODIUM LACTATE, POTASSIUM CHLORIDE, CALCIUM CHLORIDE: 600; 310; 30; 20 INJECTION, SOLUTION INTRAVENOUS at 11:16

## 2023-04-06 RX ADMIN — KETAMINE HYDROCHLORIDE 10 MG: 10 INJECTION INTRAMUSCULAR; INTRAVENOUS at 10:58

## 2023-04-06 RX ADMIN — PROPOFOL 20 MG: 10 INJECTION, EMULSION INTRAVENOUS at 10:24

## 2023-04-06 RX ADMIN — ONDANSETRON 4 MG: 2 INJECTION INTRAMUSCULAR; INTRAVENOUS at 10:25

## 2023-04-06 RX ADMIN — PROPOFOL 140 MCG/KG/MIN: 10 INJECTION, EMULSION INTRAVENOUS at 10:25

## 2023-04-06 RX ADMIN — SODIUM CHLORIDE, SODIUM LACTATE, POTASSIUM CHLORIDE, CALCIUM CHLORIDE: 600; 310; 30; 20 INJECTION, SOLUTION INTRAVENOUS at 09:38

## 2023-04-06 RX ADMIN — ACETAMINOPHEN 975 MG: 325 TABLET ORAL at 11:45

## 2023-04-06 RX ADMIN — LIDOCAINE HYDROCHLORIDE 3 ML: 20 INJECTION, SOLUTION INFILTRATION; PERINEURAL at 10:30

## 2023-04-06 RX ADMIN — PROPOFOL 50 MG: 10 INJECTION, EMULSION INTRAVENOUS at 10:26

## 2023-04-06 NOTE — ANESTHESIA POSTPROCEDURE EVALUATION
Patient: Robin Dawson    Procedure: Procedure(s):  COLONOSCOPY, EXAM UNDER ANESTHESIA WITH FISTULOTOMY AND REMOVAL OF SETON       Anesthesia Type:  MAC    Note:  Disposition: Outpatient   Postop Pain Control: Uneventful            Sign Out: Well controlled pain   PONV: No   Neuro/Psych: Uneventful            Sign Out: Acceptable/Baseline neuro status   Airway/Respiratory: Uneventful            Sign Out: Acceptable/Baseline resp. status   CV/Hemodynamics: Uneventful            Sign Out: Acceptable CV status; No obvious hypovolemia; No obvious fluid overload   Other NRE: NONE   DID A NON-ROUTINE EVENT OCCUR? No           Last vitals:  Vitals Value Taken Time   /65 04/06/23 1201   Temp 96.9  F (36.1  C) 04/06/23 1125   Pulse 62 04/06/23 1205   Resp 16 04/06/23 1201   SpO2 100 % 04/06/23 1205   Vitals shown include unvalidated device data.    Electronically Signed By: Elizabeth Josue MD  April 6, 2023  12:12 PM

## 2023-04-06 NOTE — ANESTHESIA CARE TRANSFER NOTE
Patient: Robin Dawson    Procedure: Procedure(s):  COLONOSCOPY, EXAM UNDER ANESTHESIA WITH FISTULOTOMY AND REMOVAL OF SETON       Diagnosis: Anal fistula [K60.3]  Diagnosis Additional Information: No value filed.    Anesthesia Type:   MAC     Note:    Oropharynx: oropharynx clear of all foreign objects and spontaneously breathing  Level of Consciousness: drowsy  Oxygen Supplementation: face mask  Level of Supplemental Oxygen (L/min / FiO2): 8  Independent Airway: airway patency satisfactory and stable    Vital Signs Stable: post-procedure vital signs reviewed and stable  Report to RN Given: handoff report given  Patient transferred to: Phase II    Handoff Report: Identifed the Patient, Identified the Reponsible Provider, Reviewed the pertinent medical history, Discussed the surgical course, Reviewed Intra-OP anesthesia mangement and issues during anesthesia, Set expectations for post-procedure period and Allowed opportunity for questions and acknowledgement of understanding      Vitals:  Vitals Value Taken Time   /60 04/06/23 1130   Temp 96.9  F (36.1  C) 04/06/23 1125   Pulse 84 04/06/23 1128   Resp 16 04/06/23 1130   SpO2 100 % 04/06/23 1130   Vitals shown include unvalidated device data.    Electronically Signed By: MAUREEN Roe CRNA  April 6, 2023  11:32 AM

## 2023-04-06 NOTE — OP NOTE
[facility] OPERATIVE NOTE    DATE OF SERVICE: 4/6/2023     PROCEDURE NAME:   1. Colonoscopy with snare polypectomy (note in [place], but done as part of this procedure)  2. Rectal exam under anesthesia  3. Removal of seton  4. Fistultomy     PRE-PROCEDURE DIAGNOSIS:   1. Anal fistula  2. Average risk colon cancer screening     POST-PROCEDURE DIAGNOSIS: Same     SURGEON: Leana Yo MD     ASSISTANT: None     FINDINGS: In the colon, there was a small 2 mm polyp in the sigmoid colon.  Everything else including the terminal ileum was normal with the exception of diverticulosis from the transverse colon to the rectosigmoid junction.  The note for this will be dictated in [place]ation but it was part of this surgical procedure.  Regarding the fistula, there was no communication to the anal canal.  Therefore, the 2 external openings were connected by a fistulotomy and unroofed.  The entire area of the fistulotomy appeared to be just scar with a very small amount of external sphincter muscle.     ESTIMATED BLOOD LOSS: 1 mL     ANESTHESIA: MAC     SPECIMEN: Sigmoid colon polyp    INDICATIONS FOR PROCEDURE:  Robin Dawson is a 47 year old male presenting with recurrent anorectal abscess and suspected anorectal fistula.  I previously took him to the operating room, but he had some unusual findings of multiple external openings without any obvious connection to the anal canal.  Due to how markedly abnormal things look, I placed a seton and plan to bring him back for a colonoscopy to rule out inflammatory bowel disease as well as obtaining an MRI before further action.  The MRI demonstrated only a simple fistula.  The risks and benefits of surgery were discussed with the patient including infection, abscess recurrence, need for further operative interventions, possible damage to the sphincter and incontinence (or changes in continence) and increased pain and bleeding were discussed with the patient.  Alternatives were also discussed. The patient agreed to proceed with surgery and informed consent was obtained.     DESCRIPTION OF PROCEDURE:  The patient was taken to the operating room and placed in the left lateral position.  The patient was given MAC for anesthesia.  A timeout was performed per protocol.  I completed a colonoscopy.  The full procedure note will be dictated in probation, however the study was normal through the terminal ileum with the exception of a very small 2 mm polyp in the sigmoid colon and diverticulosis from the transverse colon down through the rectosigmoid junction. They were then placed in the prone sophie knife position on the operating room table. The buttocks were taped apart. The surgical area was then prepped and draped in the usual sterile fashion. A timeout was performed per protocol.  Examination of the perianal skin was significant for 2 external openings with a seton in place in the left lateral position tracking back to the left posterior position.  Next, a terminal pudendal nerve block was performed with 40mL of a 50/50 mix of 1% lidocaine with epinephrine and 0.25% bupivicaine. A digital rectal exam was performed which revealed no abnormalities.  Next a lubricated Webber bivalve anoscope was placed into the anal canal.  Examination of the anal canal was significant for no abnormalities.     Based on these findings I proceeded to evaluate the fistula.  I again attempted to see whether either of these external openings connected to the anal canal.  I could not see any evidence of an internal opening within the anal canal itself.  I injected both openings with hydrogen peroxide while occluding the other opening.  There was no extravasation of peroxide into the anal canal.  Therefore, this appeared to be a chronic abscess cavity and the seton was cut out and removed.  A fistula probe was passed along the tract.  This was then opened up with electrocautery.  This was surprisingly  mostly scar with only a very small amount of external sphincter muscle.  With the fistula divided, a curette was used to sharply debride the base of the abscess cavity.  Hemostasis was obtained with electrocautery.  The edges of the wound were marsupialized down using running 3-0 Vicryl sutures.    Hemostasis was confirmed. All sponge, needle and instrument counts were correct at the end of the procedure. The patient tolerated the procedure well and was awakened from anesthesia without difficulty, and transferred to the recovery room in stable condition.    COMPLICATIONS: None apparent    DISPOSITION: Stable to PACU    Leana Yo MD, RUBA  [facility]  Office: [phone]  4/6/2023 11:28 AM

## 2023-04-06 NOTE — DISCHARGE INSTRUCTIONS
If you have any questions or concerns regarding your procedure, please contact Dr. Yo, his office number is [phone].    You received 975 mg of acetaminophen (Tylenol) at 11:45 am. Please do not take an additional dose of Tylenol until after 5:45 PM.    Do not exceed 4,000 mg of acetaminophen in a 24 hour period, keep in mind that acetaminophen can also be found in many over-the-counter cold medications as well as narcotics that may be given for pain.    Patient Discharge Instructions:    You have just undergone anorectal surgery.  Here are a few things to expect after your surgery:    1) It is common to have pain after surgery.  We try to focus on non-narcotic medications when possible, although sometimes we do give a narcotic prescription for pain that cannot be controlled with Tylenol and Ibuprofen. Take the medications as follows:  Tylenol: 650mg every 4 hours for 48 hours. Then every 4 hour as needed for pain. Do not take the Tylenol if you have been otherwise directed by a doctor not to take it (for liver problems, etc).  Ibuprofen: 800mg every 8 hours for 48 hours WITH FOOD. Then every 8 hours as needed for pain. Don't take NSAIDs if you have Crohn's, Ulcerative Colitis, kidney problems, or have been otherwise instructed not to take them.   You should offset these medications (e.g. Tylenol at 12:00PM, Ibuprofen at 2pm, Tylenol at 4:00PM, Tylenol at 8:00PM, Ibuprofen at 10:00PM).  If you have Oxycodone, you can take it at any time as long as you space it out every 4 hours.     2) You may have some spotting or mild amounts of bleeding/bloody drainage.  If you see more significant bleeding, try holding some pressure over the wound for 15-20 minutes.   If you pass more than a cup full of blood or you cannot get the bleeding to stop by holding pressure, call the office.    3) Infections in this area are rare, but can be serious.  If you see small amounts of yellowish/pus like drainage in the days following  surgery, this is expected. However, if you have increasing pain, increasing drainage, fevers, or an inability to urinate (can't pee), call the office or go the ER.     4) You will feel numb in the anorectal area for 4-6 hours after surgery due to the numbing medication used in the operating room.  It is possible you may experience some fecal incontinence (accidental passage of gas/stool) during this time.  The numbness will resolve on its own.  Once you feel sensation returning, you should consider taking something for pain as you can expect oral medications to take 30-60 minutes before you start feeling their effects.    5) There is generally no specific wound care necessary immediately after surgery (unless otherwise instructed by your surgeon).  Simply showering/bathing 1-2 times a day and after bowel movements is sufficient to keep the wounds clean.  You may want to keep a dry gauze/pad over the wound site as it is normal to have some amount of drainage, and this drainage can be irritating to the skin.  If you have a packing in the wound, your surgeon will give you more specific instructions on how to manage this.    6) For some operations you may have stitches in your wound.  Those stitches almost always break within a few days of surgery.  If you feel a pop or the wound opens - this is OK.  The wound will continue to fill in on its own.  It is still ok to shower/bathe as normal.  Expect some amount of drainage if the wound is open.    7) Avoiding constipation after surgery is very important. Start by taking Psyllium Husk (e.g. Metamucil or Konsyl) 1-2 tablespoons in a large glass of water daily. In addition to this, it is very important to drink at least 64 ounces of water daily. If you get constipated, it will be much more uncomfortable when you do have a bowel movement. If you do not have a BM in 2 days, try one of the following medications (over the counter) listed below:   Milk of Magnesia 30 mL every 8  hours until BM  Miralax 1-2 capfuls daily until BM (this may take 1-2 days)  Senna 1-2 tabs twice a day as necessary    8) Your only activity restrictions are no driving while you are taking narcotics.  You may want to avoid heavy lifting/strenuous exercise for the first 1-2 days after surgery, but if you feel up to resuming normal activities/exercise, this is ok.    9) Lastly, if you have any questions or concerns - PLEASE CALL ([phone])!  Our office has someone on call 24 hours a day.  If your question is one that can wait until normal business hours (Mon-Fri 8:30AM-5PM), it is better to wait until the daytime as someone more familiar with your care will be able to help you.  However, if it is an emergency, the on-call surgeon will be able to give you good advice.    Leana Yo MD, RUBA  [facility]  Office: [phone]  4/6/2023 11:27 AM    Discharge Instructions: After Your Surgery, regarding Anesthesia    You ve just had surgery. During surgery, you were given medicine called anesthesia to keep you relaxed and free of pain. After surgery, you may have some pain or nausea. This is common. Here are some tips for feeling better and getting well after surgery.    Going home    Your healthcare provider will show you how to take care of yourself when you go home. He or she will also answer your questions. Have an adult family member or friend drive you home. For the first 24 hours after your surgery:    Don't drive or use heavy equipment.  Don't make important decisions or sign legal papers.  Don't drink alcohol.  Have an adult stay with you for the next 24 hours. He or she can watch for problems and help keep you safe.  Be sure to go to all follow-up visits with your healthcare provider. And rest after your surgery for as long as your healthcare provider tells you to.    Managing nausea    Some people have an upset stomach after surgery. This is often because of anesthesia, pain, or  pain medicine, or the stress of surgery. These tips will help you handle nausea and eat healthy foods as you get better. If you were on a special food plan before surgery, ask your healthcare provider if you should follow it while you get better. These tips may help:    Don't push yourself to eat. Your body will tell you when to eat and how much.  Start off with clear liquids and soup. They are easier to digest.  Next try semi-solid foods, such as mashed potatoes, applesauce, and gelatin, as you feel ready.  Slowly move to solid foods. Don t eat fatty, rich, or spicy foods at first.  Don't force yourself to have 3 large meals a day. Instead eat smaller amounts more often.  Take pain medicines with a small amount of solid food, such as crackers or toast, to prevent nausea.    If you have obstructive sleep apnea    You were given anesthesia medicine during surgery to keep you comfortable and free of pain. After surgery, you may have more apnea spells because of this medicine and other medicines you were given. The spells may last longer than usual.   At home:    Keep using the continuous positive airway pressure (CPAP) device when you sleep. Unless your healthcare provider tells you not to, use it when you sleep, day or night. CPAP is a common device used to treat obstructive sleep apnea.  Talk with your provider before taking any pain medicine, muscle relaxants, or sedatives. Your provider will tell you about the possible dangers of taking these medicines.

## 2023-04-06 NOTE — ANESTHESIA PREPROCEDURE EVALUATION
Anesthesia Pre-Procedure Evaluation    Patient: Robin Dawson   MRN: [id number] : 1975        Procedure : Procedure(s):  EXAM UNDER ANESTHESIA WITH POSSIBLE FISTULOTOMY AND PLACMENT OF SETON          Past Medical History:   Diagnosis Date     Chronic infection       Past Surgical History:   Procedure Laterality Date     EXAM UNDER ANESTHESIA, CHANGE DRESSING (LOCATION), COMBINED N/A 2023    Procedure: EXAM UNDER ANESTHESIA, DEBRIDEMENT OF ABSCESS CAVITY, SETON PLACEMENT;  Surgeon: Leana Yo MD;  Location: [facility]     HERNIA REPAIR      Mesh     ORTHOPEDIC SURGERY      elbow surgery      Allergies   Allergen Reactions     Naproxen Shortness Of Breath      Social History     Tobacco Use     Smoking status: Every Day     Packs/day: 1.00     Types: Cigarettes     Smokeless tobacco: Not on file   Vaping Use     Vaping status: Not on file   Substance Use Topics     Alcohol use: Not Currently      Wt Readings from Last 1 Encounters:   23 79.4 kg (175 lb)        Anesthesia Evaluation   Pt has had prior anesthetic.     No history of anesthetic complications       ROS/MED HX  ENT/Pulmonary:  - neg pulmonary ROS  (-) sleep apnea   Neurologic:  - neg neurologic ROS     Cardiovascular:  - neg cardiovascular ROS  (-) hypertension, CAD and dyslipidemia   METS/Exercise Tolerance:     Hematologic:  - neg hematologic  ROS     Musculoskeletal:  - neg musculoskeletal ROS     GI/Hepatic:  - neg GI/hepatic ROS  (-) GERD and esophageal disease   Renal/Genitourinary:  - neg Renal ROS     Endo:  - neg endo ROS     Psychiatric/Substance Use:  - neg psychiatric ROS     Infectious Disease:  - neg infectious disease ROS     Malignancy:       Other:            Physical Exam    Airway  airway exam normal      Mallampati: I    Neck ROM: full   Mouth opening: > 3 cm    Respiratory Devices and Support         Dental       (+) Modest Abnormalities - crowns, retainers, 1 or 2 missing teeth      Cardiovascular    cardiovascular exam normal       Rhythm and rate: regular and normal     Pulmonary   pulmonary exam normal        breath sounds clear to auscultation           OUTSIDE LABS:  CBC:   Lab Results   Component Value Date    WBC 7.5 08/20/2018    WBC 14.1 (H) 06/18/2013    HGB 16.5 08/20/2018    HGB 16.5 06/18/2013    HCT 45.8 08/20/2018    HCT 47.0 06/18/2013     08/20/2018     06/18/2013     BMP:   Lab Results   Component Value Date     08/20/2018    POTASSIUM 3.9 08/20/2018    CHLORIDE 108 (H) 08/20/2018    CO2 22 08/20/2018    BUN 8 08/20/2018    CR 0.89 08/20/2018     08/20/2018     COAGS: No results found for: PTT, INR, FIBR  POC: No results found for: BGM, HCG, HCGS  HEPATIC: No results found for: ALBUMIN, PROTTOTAL, ALT, AST, GGT, ALKPHOS, BILITOTAL, BILIDIRECT, [place]  OTHER:   Lab Results   Component Value Date    CHENCHO 9.1 08/20/2018    MAG 1.9 08/20/2018    SED 2 08/22/2018       Anesthesia Plan    ASA Status:  2   NPO Status:  NPO Appropriate    Anesthesia Type: MAC.   Induction: Intravenous.   Maintenance: Inhalation.        Consents    Anesthesia Plan(s) and associated risks, benefits, and realistic alternatives discussed. Questions answered and patient/representative(s) expressed understanding.    - Discussed:     - Discussed with:  Patient      - Extended Intubation/Ventilatory Support Discussed: No.      - Patient is DNR/DNI Status: No    Use of blood products discussed: No .     Postoperative Care    Pain management: IV analgesics, Oral pain medications.   PONV prophylaxis: Ondansetron (or other 5HT-3), Dexamethasone or Solumedrol     Comments:    Other Comments:   Discussed possibility of intra-op awareness or need to convert to GETA.    Patient history and physical exam reviewed. NPO status appropriate. Focused physical and history performed, pre-op evaluation updated. RBA discussed re: anesthesia and patients questions answered.             Elizabeth Josue MD

## 2023-04-06 NOTE — H&P
[facility]   History and Physical       History of Present Illness: 47 year old male w/ hx of complex anal fistula.     Past Medical History:   Diagnosis Date     Chronic infection        Allergies   Allergen Reactions     Naproxen Shortness Of Breath and Palpitations       Past Surgical History:   Procedure Laterality Date     EXAM UNDER ANESTHESIA, CHANGE DRESSING (LOCATION), COMBINED N/A 02/16/2023    Procedure: EXAM UNDER ANESTHESIA, DEBRIDEMENT OF ABSCESS CAVITY, SETON PLACEMENT;  Surgeon: Leana Yo MD;  Location: [facility]     HERNIA REPAIR      Mesh     ORTHOPEDIC SURGERY      elbow surgery       History reviewed. No pertinent family history.    Social History     Socioeconomic History     Marital status: Single     Spouse name: Not on file     Number of children: Not on file     Years of education: Not on file     Highest education level: Not on file   Occupational History     Not on file   Tobacco Use     Smoking status: Every Day     Packs/day: 1.00     Types: Cigarettes     Smokeless tobacco: Not on file   Vaping Use     Vaping status: Not on file   Substance and Sexual Activity     Alcohol use: Not Currently     Drug use: No     Sexual activity: Yes     Partners: Female   Other Topics Concern     Not on file   Social History Narrative     Not on file     Social Determinants of Health     Financial Resource Strain: Not on file   Food Insecurity: Not on file   Transportation Needs: Not on file   Physical Activity: Not on file   Stress: Not on file   Social Connections: Not on file   Intimate Partner Violence: Not on file   Housing Stability: Not on file       Current Outpatient Medications   Medication     fish oil-omega-3 fatty acids 1000 MG capsule     multivitamin w/minerals (THERA-VIT-M) tablet     Current Facility-Administered Medications   Medication     lactated ringers infusion     lidocaine (LMX4) kit     lidocaine 1 % 0.1-1 mL     PRE OP antibiotics NOT needed  for this surgical procedure     sodium chloride (PF) 0.9% PF flush 3 mL     sodium chloride (PF) 0.9% PF flush 3 mL       Review of Systems:   A full 10 point review of systems was taken and is negative aside from what is noted above in the HPI.    Consitutional / General: Denies wt changes, fevers, chills or sweats.  Skin: Denies rashes, bruising, pruritis, or bleeding tendencies.   ENT: Denies trauma, headache, hearing loss, tinnitus, dysphagia  Eyes: Denies double vision  Respiratory: Denies SOB, cough, sputum production or dyspnea on exertion.   Cardiovascular: Denies chest pain, palpitations, orthostatic hypotension  Hematology / Lymph: Denies hx of blood clots or pulmonary embolism  Gastrointestinal:  Denies loss of appetite, dysphagia, N/V, constipation or diarrhea.   Genitourinary: Denies dysuria, frequency, urgency, hesitancy, incontinence, nocturia, hematuria, difficulty starting or stopping their stream, hx of stones or hx of uti's.   Neurologic: Denies headache, LOC, memory loss, vertigo, syncope or seizures.   Musculoskeletal: Denies back pain, numbness, tingling or hx of back surgery  Psychological: alert and oriented    Intake/Output past 24 hrs:  No intake or output data in the 24 hours ending 04/06/23 0958    Physical Exam:  Temp:  [97.8  F (36.6  C)] 97.8  F (36.6  C)  Resp:  [16] 16  BP: (102)/(70) 102/70  SpO2:  [100 %] 100 %  General: NAD, alert, cooperative  Head: normocephalic, without abnormality / atraumatic  Respiratory: non-labored breathing, CTA-B  Cardiac: RRR +S1/S2  Abdomen: soft, non-tender, non-distended.  No guarding or rebound   Perianal/Rectal: deferred   Skin: no rashes or lesions  Musculoskeletal: moves all four extremities equally; no calf edema or tenderness  Psychological: alert and oriented, answers questions appropriately  Neurological: cranial nerves grossly intact    CBC RESULTS:   Recent Labs   Lab Test 08/20/18  1135   WBC 7.5   RBC 5.02   HGB 16.5   HCT 45.8   MCV 91    MCH 32.9   MCHC 36.0   RDW 11.9          Last Comprehensive Metabolic Panel:  Sodium   Date Value Ref Range Status   08/20/2018 140 136 - 145 mmol/L Final     Potassium   Date Value Ref Range Status   08/20/2018 3.9 3.5 - 5.0 mmol/L Final     Chloride   Date Value Ref Range Status   08/20/2018 108 (H) 98 - 107 mmol/L Final     Carbon Dioxide (CO2)   Date Value Ref Range Status   08/20/2018 22 22 - 31 mmol/L Final     Anion Gap   Date Value Ref Range Status   08/20/2018 10 5 - 18 mmol/L Final     Glucose   Date Value Ref Range Status   08/20/2018 122 70 - 125 mg/dL Final     Urea Nitrogen   Date Value Ref Range Status   08/20/2018 8 8 - 22 mg/dL Final     Creatinine   Date Value Ref Range Status   08/20/2018 0.89 0.70 - 1.30 mg/dL Final     GFR Estimate   Date Value Ref Range Status   08/20/2018 >60 >60 mL/min/1.73m2 Final     Calcium   Date Value Ref Range Status   08/20/2018 9.1 8.5 - 10.5 mg/dL Final       Assessment: Robin Dawson is a 47 year old male presenting with complex anal fistula.     Plan: Colonoscopy + EUA possible fistulotomy possible seton under YFN Yo MD, RUBA  [facility]  Office: [phone]  4/6/2023 9:58 AM

## 2024-03-12 ENCOUNTER — HOSPITAL ENCOUNTER (EMERGENCY)
Facility: CLINIC | Age: 49
Discharge: HOME | End: 2024-03-12
Attending: EMERGENCY MEDICINE | Admitting: EMERGENCY MEDICINE
Payer: OTHER MISCELLANEOUS

## 2024-03-12 VITALS
TEMPERATURE: 98.5 F | OXYGEN SATURATION: 100 % | HEART RATE: 90 BPM | RESPIRATION RATE: 16 BRPM | WEIGHT: 175 LBS | SYSTOLIC BLOOD PRESSURE: 112 MMHG | BODY MASS INDEX: 25.05 KG/M2 | DIASTOLIC BLOOD PRESSURE: 84 MMHG | HEIGHT: 70 IN

## 2024-03-12 DIAGNOSIS — Z77.098 OCCUPATIONAL EXPOSURE TO CHEMICALS: ICD-10-CM

## 2024-03-12 DIAGNOSIS — T57.1X1S: ICD-10-CM

## 2024-03-12 PROCEDURE — 250N000013 HC RX MED GY IP 250 OP 250 PS 637: Performed by: EMERGENCY MEDICINE

## 2024-03-12 PROCEDURE — 99283 EMERGENCY DEPT VISIT LOW MDM: CPT

## 2024-03-12 RX ORDER — MAGNESIUM HYDROXIDE/ALUMINUM HYDROXICE/SIMETHICONE 120; 1200; 1200 MG/30ML; MG/30ML; MG/30ML
30 SUSPENSION ORAL ONCE
Status: COMPLETED | OUTPATIENT
Start: 2024-03-12 | End: 2024-03-12

## 2024-03-12 RX ADMIN — ALUMINUM HYDROXIDE, MAGNESIUM HYDROXIDE, AND DIMETHICONE 30 ML: 200; 20; 200 SUSPENSION ORAL at 17:36

## 2024-03-12 ASSESSMENT — COLUMBIA-SUICIDE SEVERITY RATING SCALE - C-SSRS
2. HAVE YOU ACTUALLY HAD ANY THOUGHTS OF KILLING YOURSELF IN THE PAST MONTH?: NO
1. IN THE PAST MONTH, HAVE YOU WISHED YOU WERE DEAD OR WISHED YOU COULD GO TO SLEEP AND NOT WAKE UP?: NO
6. HAVE YOU EVER DONE ANYTHING, STARTED TO DO ANYTHING, OR PREPARED TO DO ANYTHING TO END YOUR LIFE?: NO

## 2024-03-12 ASSESSMENT — ACTIVITIES OF DAILY LIVING (ADL): ADLS_ACUITY_SCORE: 35

## 2024-03-12 NOTE — DISCHARGE INSTRUCTIONS
"  What do you do next:   Continue your home medications unless we have specifically changed them  Stay hydrated.  You can consider using over-the-counter Maalox if needed for stomach upset.  The \"phosphoric acid\" in the chemical is fortunately not a strong acid and given your exam today should not pose a long-term problem.  Follow up as indicated below    When do you return: Review your discharge papers for specifics on reasons to return.    Thank you for allowing us to care for you today.    "

## 2024-03-12 NOTE — ED TRIAGE NOTES
Patient accidentally ingested industrial soap called GF PREP 618. Employer presents with patient with SDS on substance. Patient complains of nausea, throat irration, cough, burning on skin. Patient reports that he dropped the bottle of the soap causing it to splash back at him, entering his eyes, nose, mouth. Splashed across body as well. Patient rinsed self with hose following exposure.